# Patient Record
Sex: MALE | Race: WHITE | URBAN - METROPOLITAN AREA
[De-identification: names, ages, dates, MRNs, and addresses within clinical notes are randomized per-mention and may not be internally consistent; named-entity substitution may affect disease eponyms.]

---

## 2020-07-27 ENCOUNTER — NURSE TRIAGE (OUTPATIENT)
Dept: NURSING | Facility: CLINIC | Age: 75
End: 2020-07-27

## 2020-07-27 NOTE — TELEPHONE ENCOUNTER
Patient asking to schedule test.  He needs appt with provider who then orders test if needed.  He was sent to scheduling to schedule urgent care covid visit.    Maria De Jesus Gavin RN  [facility] Nurse Advisor

## 2021-05-26 ENCOUNTER — RECORDS - HEALTHEAST (OUTPATIENT)
Dept: ADMINISTRATIVE | Facility: CLINIC | Age: 76
End: 2021-05-26